# Patient Record
Sex: FEMALE | Race: WHITE
[De-identification: names, ages, dates, MRNs, and addresses within clinical notes are randomized per-mention and may not be internally consistent; named-entity substitution may affect disease eponyms.]

---

## 2022-03-09 ENCOUNTER — HOSPITAL ENCOUNTER (EMERGENCY)
Dept: HOSPITAL 79 - ER1 | Age: 72
Discharge: HOME | End: 2022-03-09
Payer: MEDICARE

## 2022-03-09 DIAGNOSIS — I10: ICD-10-CM

## 2022-03-09 DIAGNOSIS — Z88.5: ICD-10-CM

## 2022-03-09 DIAGNOSIS — R53.1: Primary | ICD-10-CM

## 2022-03-09 DIAGNOSIS — Z20.822: ICD-10-CM

## 2022-03-09 LAB
BUN/CREATININE RATIO: 21 (ref 0–10)
HGB BLD-MCNC: 8.5 GM/DL (ref 12.3–15.3)
RED BLOOD COUNT: 2.67 M/UL (ref 4–5.1)
WHITE BLOOD COUNT: 16.7 K/UL (ref 4.5–11)

## 2022-03-10 ENCOUNTER — HOSPITAL ENCOUNTER (OUTPATIENT)
Dept: HOSPITAL 79 - KOH-I | Age: 72
End: 2022-03-10
Attending: INTERNAL MEDICINE
Payer: MEDICARE

## 2022-03-10 DIAGNOSIS — K76.0: ICD-10-CM

## 2022-03-10 DIAGNOSIS — N28.1: Primary | ICD-10-CM

## 2022-03-10 DIAGNOSIS — N13.30: ICD-10-CM

## 2022-03-25 ENCOUNTER — HOSPITAL ENCOUNTER (OUTPATIENT)
Dept: HOSPITAL 79 - MAMO | Age: 72
End: 2022-03-25
Attending: INTERNAL MEDICINE
Payer: MEDICARE

## 2022-03-25 DIAGNOSIS — C50.919: Primary | ICD-10-CM

## 2022-03-25 DIAGNOSIS — R89.8: ICD-10-CM

## 2022-03-25 DIAGNOSIS — N64.59: ICD-10-CM

## 2022-03-25 DIAGNOSIS — N64.4: ICD-10-CM

## 2022-03-25 PROCEDURE — G0279 TOMOSYNTHESIS, MAMMO: HCPCS

## 2022-03-28 ENCOUNTER — HOSPITAL ENCOUNTER (OUTPATIENT)
Dept: HOSPITAL 79 - EMI | Age: 72
End: 2022-03-28
Attending: INTERNAL MEDICINE
Payer: MEDICARE

## 2022-03-28 DIAGNOSIS — D69.6: Primary | ICD-10-CM

## 2022-03-28 DIAGNOSIS — G31.9: ICD-10-CM

## 2022-03-28 DIAGNOSIS — J32.0: ICD-10-CM

## 2022-03-28 DIAGNOSIS — K90.9: ICD-10-CM

## 2022-03-28 DIAGNOSIS — D72.829: ICD-10-CM

## 2022-03-28 DIAGNOSIS — R93.89: ICD-10-CM

## 2022-03-28 PROCEDURE — A9577 INJ MULTIHANCE: HCPCS

## 2022-04-01 ENCOUNTER — HOSPITAL ENCOUNTER (OUTPATIENT)
Dept: HOSPITAL 79 - OPSV | Age: 72
End: 2022-04-01
Attending: INTERNAL MEDICINE
Payer: MEDICARE

## 2022-04-01 VITALS — BODY MASS INDEX: 37.49 KG/M2 | HEIGHT: 65 IN | WEIGHT: 225 LBS

## 2022-04-01 DIAGNOSIS — D64.9: Primary | ICD-10-CM

## 2022-04-01 LAB — HGB BLD-MCNC: 7.7 GM/DL (ref 12.3–15.3)

## 2022-04-01 PROCEDURE — P9016 RBC LEUKOCYTES REDUCED: HCPCS

## 2022-04-05 ENCOUNTER — HOSPITAL ENCOUNTER (OUTPATIENT)
Dept: HOSPITAL 79 - NM | Age: 72
End: 2022-04-05
Attending: INTERNAL MEDICINE
Payer: MEDICARE

## 2022-04-05 DIAGNOSIS — D72.829: ICD-10-CM

## 2022-04-05 DIAGNOSIS — D69.6: Primary | ICD-10-CM

## 2022-04-05 DIAGNOSIS — K90.9: ICD-10-CM

## 2022-04-05 PROCEDURE — A9503 TC99M MEDRONATE: HCPCS

## 2022-04-14 ENCOUNTER — HOSPITAL ENCOUNTER (OUTPATIENT)
Dept: HOSPITAL 79 - MAMO | Age: 72
End: 2022-04-14
Attending: INTERNAL MEDICINE
Payer: MEDICARE

## 2022-04-14 DIAGNOSIS — M81.0: ICD-10-CM

## 2022-04-14 DIAGNOSIS — D72.829: ICD-10-CM

## 2022-04-14 DIAGNOSIS — D69.6: Primary | ICD-10-CM

## 2022-04-14 DIAGNOSIS — K90.9: ICD-10-CM

## 2022-04-25 ENCOUNTER — HOSPITAL ENCOUNTER (OUTPATIENT)
Dept: HOSPITAL 79 - RAD | Age: 72
End: 2022-04-25
Attending: NURSE PRACTITIONER
Payer: MEDICARE

## 2022-04-25 DIAGNOSIS — E03.9: Primary | ICD-10-CM

## 2022-04-25 DIAGNOSIS — R60.9: ICD-10-CM

## 2022-04-25 DIAGNOSIS — R06.02: ICD-10-CM

## 2022-04-25 DIAGNOSIS — N18.9: ICD-10-CM

## 2022-04-25 LAB
BUN/CREATININE RATIO: 15 (ref 0–10)
HGB BLD-MCNC: 8.7 GM/DL (ref 12.3–15.3)
RED BLOOD COUNT: 2.3 M/UL (ref 4–5.1)
WHITE BLOOD COUNT: 9.1 K/UL (ref 4.5–11)

## 2022-05-06 ENCOUNTER — HOSPITAL ENCOUNTER (INPATIENT)
Dept: HOSPITAL 79 - ER1 | Age: 72
LOS: 4 days | Discharge: HOME HEALTH | DRG: 180 | End: 2022-05-10
Attending: INTERNAL MEDICINE | Admitting: INTERNAL MEDICINE
Payer: MEDICARE

## 2022-05-06 VITALS — WEIGHT: 220 LBS | BODY MASS INDEX: 36.65 KG/M2 | HEIGHT: 65 IN

## 2022-05-06 DIAGNOSIS — Z79.899: ICD-10-CM

## 2022-05-06 DIAGNOSIS — Z80.3: ICD-10-CM

## 2022-05-06 DIAGNOSIS — C79.51: ICD-10-CM

## 2022-05-06 DIAGNOSIS — N18.30: ICD-10-CM

## 2022-05-06 DIAGNOSIS — G89.3: ICD-10-CM

## 2022-05-06 DIAGNOSIS — I50.33: ICD-10-CM

## 2022-05-06 DIAGNOSIS — J98.11: ICD-10-CM

## 2022-05-06 DIAGNOSIS — J91.0: ICD-10-CM

## 2022-05-06 DIAGNOSIS — Z90.710: ICD-10-CM

## 2022-05-06 DIAGNOSIS — D64.9: ICD-10-CM

## 2022-05-06 DIAGNOSIS — D69.6: ICD-10-CM

## 2022-05-06 DIAGNOSIS — C79.81: ICD-10-CM

## 2022-05-06 DIAGNOSIS — Z88.2: ICD-10-CM

## 2022-05-06 DIAGNOSIS — Z20.822: ICD-10-CM

## 2022-05-06 DIAGNOSIS — E78.5: ICD-10-CM

## 2022-05-06 DIAGNOSIS — Z83.3: ICD-10-CM

## 2022-05-06 DIAGNOSIS — Z88.5: ICD-10-CM

## 2022-05-06 DIAGNOSIS — M19.91: ICD-10-CM

## 2022-05-06 DIAGNOSIS — K21.9: ICD-10-CM

## 2022-05-06 DIAGNOSIS — M54.9: ICD-10-CM

## 2022-05-06 DIAGNOSIS — D61.811: ICD-10-CM

## 2022-05-06 DIAGNOSIS — J96.01: ICD-10-CM

## 2022-05-06 DIAGNOSIS — I13.0: ICD-10-CM

## 2022-05-06 DIAGNOSIS — S22.39XA: ICD-10-CM

## 2022-05-06 DIAGNOSIS — C78.00: Primary | ICD-10-CM

## 2022-05-06 LAB
BUN/CREATININE RATIO: 15 (ref 0–10)
HGB BLD-MCNC: 8.6 GM/DL (ref 12.3–15.3)
RED BLOOD COUNT: 2.32 M/UL (ref 4–5.1)
WHITE BLOOD COUNT: 8.3 K/UL (ref 4.5–11)

## 2022-05-06 PROCEDURE — 0W9B3ZZ DRAINAGE OF LEFT PLEURAL CAVITY, PERCUTANEOUS APPROACH: ICD-10-PCS | Performed by: INTERNAL MEDICINE

## 2022-05-07 LAB
AMYLASE, BODY FLUID: (no result) U/L
BODY FLUID SOURCE: (no result)
BUN/CREATININE RATIO: 14 (ref 0–10)
HGB BLD-MCNC: 7.9 GM/DL (ref 12.3–15.3)
LDH FLD L TO P-CCNC: 254 U/L
MONONUCLEAR CELLS: 37.2 (ref 75–100)
POLYMORPHONUCLEAR %: 62.8 (ref 0–25)
RBC (AUTOMATED): 6500 (ref 0–100000)
RBC (MANUAL): (no result)
RED BLOOD COUNT: 2.14 M/UL (ref 4–5.1)
WBC (AUTOMATED): 2166 (ref 0–500)
WBC (MANUAL): (no result)
WHITE BLOOD COUNT: 7.8 K/UL (ref 4.5–11)

## 2022-05-09 LAB
BUN/CREATININE RATIO: 15 (ref 0–10)
HGB BLD-MCNC: 7.5 GM/DL (ref 12.3–15.3)
RED BLOOD COUNT: 2.03 M/UL (ref 4–5.1)
WHITE BLOOD COUNT: 8.1 K/UL (ref 4.5–11)

## 2022-05-09 NOTE — NUR
[id number] PATIENTS DAUGHTER CALLED TO INFORM ME THAT SHE HAD GIVEN HER MOTHER ALL
OF HER HOME MEDS TODAY.  THAT DR SEBASTIAN WAS CONCERNED ABOUT HER BP.  THE
DAUGHTER ASKED IF I COULD PLEASE KEEP A CLOSE EYE ON THE BP AND INFORM HER IF
IT BECOMES TOO LOW.  I AGREEDED AND I CLEARIFIED WITH THAT DAUGHTER IF SHE HAD
GIVEN THE EVENING COREG DOSE.  SHE STATED "YES I GAVE IT BEFORE I LEFT".
I HELD THE EVENING MEDS AND MADE A NOTE, I WILL ALSO REPORT THIS TO HER ON
COMING DAYSHIFT NURSE.

## 2022-05-10 LAB
BUN/CREATININE RATIO: 15 (ref 0–10)
HGB BLD-MCNC: 7.6 GM/DL (ref 12.3–15.3)
RED BLOOD COUNT: 2.09 M/UL (ref 4–5.1)
WHITE BLOOD COUNT: 7.6 K/UL (ref 4.5–11)

## 2022-07-07 ENCOUNTER — HOSPITAL ENCOUNTER (OUTPATIENT)
Dept: HOSPITAL 79 - NM | Age: 72
End: 2022-07-07
Attending: INTERNAL MEDICINE
Payer: MEDICARE

## 2022-07-07 DIAGNOSIS — D72.829: ICD-10-CM

## 2022-07-07 DIAGNOSIS — D69.6: Primary | ICD-10-CM

## 2022-07-07 DIAGNOSIS — K90.9: ICD-10-CM

## 2022-07-07 PROCEDURE — A9503 TC99M MEDRONATE: HCPCS

## 2022-09-21 ENCOUNTER — HOSPITAL ENCOUNTER (OUTPATIENT)
Dept: HOSPITAL 79 - NM | Age: 72
End: 2022-09-21
Attending: INTERNAL MEDICINE
Payer: MEDICARE

## 2022-09-21 DIAGNOSIS — D69.6: Primary | ICD-10-CM

## 2022-09-21 DIAGNOSIS — C79.52: ICD-10-CM

## 2022-09-21 DIAGNOSIS — K90.9: ICD-10-CM

## 2022-09-21 DIAGNOSIS — D72.829: ICD-10-CM

## 2022-09-21 PROCEDURE — A9503 TC99M MEDRONATE: HCPCS
